# Patient Record
Sex: FEMALE | Race: WHITE | NOT HISPANIC OR LATINO | Employment: STUDENT | URBAN - METROPOLITAN AREA
[De-identification: names, ages, dates, MRNs, and addresses within clinical notes are randomized per-mention and may not be internally consistent; named-entity substitution may affect disease eponyms.]

---

## 2023-06-28 ENCOUNTER — CLINICAL SUPPORT (OUTPATIENT)
Dept: PEDIATRICS | Facility: CLINIC | Age: 23
End: 2023-06-28
Payer: COMMERCIAL

## 2023-06-28 DIAGNOSIS — Z11.1 PPD SCREENING TEST: ICD-10-CM

## 2023-06-28 PROCEDURE — 86580 TB INTRADERMAL TEST: CPT | Performed by: PEDIATRICS

## 2023-06-28 NOTE — PROGRESS NOTES
Patient ID: Krystal is here today for the following:     Procedures      1. PPD screening test  TB Skin Test

## 2023-06-30 ENCOUNTER — CLINICAL SUPPORT (OUTPATIENT)
Dept: PEDIATRICS | Facility: CLINIC | Age: 23
End: 2023-06-30
Payer: COMMERCIAL

## 2023-06-30 DIAGNOSIS — Z11.1 ENCOUNTER FOR PPD SKIN TEST READING: Primary | ICD-10-CM

## 2023-06-30 LAB
INDURATION: 0.1 MM
TB SKIN TEST: NEGATIVE

## 2023-11-03 ENCOUNTER — TELEPHONE (OUTPATIENT)
Dept: PEDIATRICS | Facility: CLINIC | Age: 23
End: 2023-11-03
Payer: COMMERCIAL

## 2023-11-03 ENCOUNTER — TELEMEDICINE (OUTPATIENT)
Dept: PEDIATRICS | Facility: CLINIC | Age: 23
End: 2023-11-03
Payer: COMMERCIAL

## 2023-11-03 DIAGNOSIS — B00.1 RECURRENT COLD SORES: Primary | ICD-10-CM

## 2023-11-03 PROCEDURE — 99213 OFFICE O/P EST LOW 20 MIN: CPT | Performed by: PEDIATRICS

## 2023-11-03 RX ORDER — ACYCLOVIR 50 MG/G
1 OINTMENT TOPICAL
Qty: 30 G | Refills: 2 | Status: SHIPPED | OUTPATIENT
Start: 2023-11-03 | End: 2024-01-10 | Stop reason: ALTCHOICE

## 2023-11-03 RX ORDER — VALACYCLOVIR HYDROCHLORIDE 500 MG/1
TABLET, FILM COATED ORAL
Qty: 60 TABLET | Refills: 0 | Status: SHIPPED | OUTPATIENT
Start: 2023-11-03

## 2023-11-03 NOTE — PROGRESS NOTES
Subjective     History was provided by the  patient .    Krystal is here with the following concern:    Via virtual visit; Krystal has started with a cold sore last evening on left lower lip. It has not broken the skin yet. She has a history of cold sores and uses Valtrex to stop them from progressing. Last time she had one was > 6 months ago. Triggers are when Krystal becomes stressed.   No fever or other system signs or symptoms.    Objective     There were no vitals taken for this visit.  @physicalexam@    General:  Well-appearing, well hydrated and in no acute distress     Eyes:  Lids:  normal  Conjunctivae:  normal     ENT:    Mouth:  mucosa moist; 3 mm raised red area on skin and lip border left lower lip    Neck:  supple     Respiratory:  Respiratory rate:  normal               Assessment/Plan     Krystal Brink is well-appearing, well-hydrated, in no acute distress, and afebrile at today's visit.    Her clinical presentation and examination indicates the diagnosis of recurrent cold sores, mild and not often    Her treatment plan includes sent in rx for Valtrex and topical ointment (acyclovir). Supportive care. To make well visit when home from college over winter break.    Supportive care measures and expected course of illness reviewed.    Follow up promptly for worsening or prolonged illness.    Shanna James MD

## 2023-11-03 NOTE — TELEPHONE ENCOUNTER
Krystal asked her mom to call. Krystal was requesting another dose of Valtrex for cold sores. She is at college. Would you like me to have her set up a virtual visit? Please advise.   [phone]

## 2023-11-03 NOTE — TELEPHONE ENCOUNTER
Please call Krystal/mom back; I would like a virtual visit. BTW, I haven't seen her in over a year for well care (July 2022).  She should set up well visit for when home from college at winter break. Thanks.

## 2023-11-10 ENCOUNTER — OFFICE VISIT (OUTPATIENT)
Dept: PEDIATRICS | Facility: CLINIC | Age: 23
End: 2023-11-10
Payer: COMMERCIAL

## 2023-11-10 ENCOUNTER — LAB (OUTPATIENT)
Dept: LAB | Facility: LAB | Age: 23
End: 2023-11-10
Payer: COMMERCIAL

## 2023-11-10 VITALS — TEMPERATURE: 98.5 F | BODY MASS INDEX: 25.98 KG/M2 | WEIGHT: 149 LBS

## 2023-11-10 DIAGNOSIS — R63.5 UNEXPLAINED WEIGHT GAIN: ICD-10-CM

## 2023-11-10 DIAGNOSIS — N94.6 DYSMENORRHEA: ICD-10-CM

## 2023-11-10 DIAGNOSIS — R10.30 LOWER ABDOMINAL PAIN: ICD-10-CM

## 2023-11-10 DIAGNOSIS — R19.7 BLOODY DIARRHEA: ICD-10-CM

## 2023-11-10 DIAGNOSIS — K64.9 HEMORRHOIDS, UNSPECIFIED HEMORRHOID TYPE: ICD-10-CM

## 2023-11-10 DIAGNOSIS — R63.5 UNEXPLAINED WEIGHT GAIN: Primary | ICD-10-CM

## 2023-11-10 LAB
25(OH)D3 SERPL-MCNC: 38 NG/ML (ref 30–100)
ALBUMIN SERPL BCP-MCNC: 4.8 G/DL (ref 3.4–5)
ALP SERPL-CCNC: 75 U/L (ref 33–110)
ALT SERPL W P-5'-P-CCNC: 19 U/L (ref 7–45)
ANION GAP SERPL CALC-SCNC: 14 MMOL/L (ref 10–20)
AST SERPL W P-5'-P-CCNC: 16 U/L (ref 9–39)
BASOPHILS # BLD AUTO: 0.04 X10*3/UL (ref 0–0.1)
BASOPHILS NFR BLD AUTO: 0.6 %
BILIRUB SERPL-MCNC: 0.5 MG/DL (ref 0–1.2)
BUN SERPL-MCNC: 11 MG/DL (ref 6–23)
CALCIUM SERPL-MCNC: 10.1 MG/DL (ref 8.6–10.6)
CHLORIDE SERPL-SCNC: 104 MMOL/L (ref 98–107)
CO2 SERPL-SCNC: 25 MMOL/L (ref 21–32)
CREAT SERPL-MCNC: 0.77 MG/DL (ref 0.5–1.05)
EOSINOPHIL # BLD AUTO: 0.05 X10*3/UL (ref 0–0.7)
EOSINOPHIL NFR BLD AUTO: 0.8 %
ERYTHROCYTE [DISTWIDTH] IN BLOOD BY AUTOMATED COUNT: 13 % (ref 11.5–14.5)
ERYTHROCYTE [SEDIMENTATION RATE] IN BLOOD BY WESTERGREN METHOD: 8 MM/H (ref 0–20)
FERRITIN SERPL-MCNC: 15 NG/ML (ref 8–150)
GFR SERPL CREATININE-BSD FRML MDRD: >90 ML/MIN/1.73M*2
GLUCOSE SERPL-MCNC: 87 MG/DL (ref 74–99)
HCT VFR BLD AUTO: 41.8 % (ref 36–46)
HGB BLD-MCNC: 13.6 G/DL (ref 12–16)
IMM GRANULOCYTES # BLD AUTO: 0.03 X10*3/UL (ref 0–0.7)
IMM GRANULOCYTES NFR BLD AUTO: 0.5 % (ref 0–0.9)
LYMPHOCYTES # BLD AUTO: 1.65 X10*3/UL (ref 1.2–4.8)
LYMPHOCYTES NFR BLD AUTO: 26.3 %
MCH RBC QN AUTO: 29.4 PG (ref 26–34)
MCHC RBC AUTO-ENTMCNC: 32.5 G/DL (ref 32–36)
MCV RBC AUTO: 91 FL (ref 80–100)
MONOCYTES # BLD AUTO: 0.47 X10*3/UL (ref 0.1–1)
MONOCYTES NFR BLD AUTO: 7.5 %
NEUTROPHILS # BLD AUTO: 4.03 X10*3/UL (ref 1.2–7.7)
NEUTROPHILS NFR BLD AUTO: 64.3 %
NRBC BLD-RTO: 0 /100 WBCS (ref 0–0)
PLATELET # BLD AUTO: 347 X10*3/UL (ref 150–450)
POTASSIUM SERPL-SCNC: 4.4 MMOL/L (ref 3.5–5.3)
PROT SERPL-MCNC: 8.3 G/DL (ref 6.4–8.2)
RBC # BLD AUTO: 4.62 X10*6/UL (ref 4–5.2)
SODIUM SERPL-SCNC: 139 MMOL/L (ref 136–145)
TSH SERPL-ACNC: 1.9 MIU/L (ref 0.44–3.98)
WBC # BLD AUTO: 6.3 X10*3/UL (ref 4.4–11.3)

## 2023-11-10 PROCEDURE — 80053 COMPREHEN METABOLIC PANEL: CPT

## 2023-11-10 PROCEDURE — 36415 COLL VENOUS BLD VENIPUNCTURE: CPT

## 2023-11-10 PROCEDURE — 85025 COMPLETE CBC W/AUTO DIFF WBC: CPT

## 2023-11-10 PROCEDURE — 82306 VITAMIN D 25 HYDROXY: CPT

## 2023-11-10 PROCEDURE — 82728 ASSAY OF FERRITIN: CPT

## 2023-11-10 PROCEDURE — 99214 OFFICE O/P EST MOD 30 MIN: CPT | Performed by: PEDIATRICS

## 2023-11-10 PROCEDURE — 85652 RBC SED RATE AUTOMATED: CPT

## 2023-11-10 PROCEDURE — 84443 ASSAY THYROID STIM HORMONE: CPT

## 2023-11-10 RX ORDER — HYDROCORTISONE 25 MG/G
CREAM TOPICAL 2 TIMES DAILY
Qty: 28 G | Refills: 1 | Status: SHIPPED | OUTPATIENT
Start: 2023-11-10 | End: 2023-12-10

## 2023-11-10 NOTE — PROGRESS NOTES
"Subjective     History was provided by the  patient .    Krystal is here with the following concern:    Krystal says that she has had a hemorrhoid for the past 6 months.  She thinks initially it was \"internal\" and she saw blood on toilet paper, sometimes in stool, mixed in. She did not do anything. She says she had diarrhea after eating, she says that she sometimes has a solid normal BM, easy to pass, no blood but a lot of mucus. Nauseated all the time and takes peptobismal 3-4 days/7 days for a long time.   No fevers. Denies THC. She is in a nursing program.  She feels like she has gained a lot of weight over the past 5-6 months. She is on ocp but no recent change in pill strength.  She takes trazadone for sleep for 8months but is no longer helping with sleep. She has difficulty falling asleep, taking 60-90 minutes to fall asleep and she wakes up every 1-2 hours through the night.   She takes naps.   She does yoga in the morning.    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    Objective     Temp 36.9 °C (98.5 °F)   Wt 67.6 kg (149 lb)   BMI 25.98 kg/m²   @physicalexam@    General:  Well-appearing, well hydrated and in no acute distress     Eyes:  Lids:  normal  Conjunctivae:  normal     ENT:  Ears:  RTM: normal yes           LTM:  normal yes  Nose:  nares clear  Mouth:  mucosa moist; no visible lesions  Throat:  OP clear yes and moist; uvula midline  Neck:  supple     Respiratory:  Respiratory rate:  normal  Air exchange:  normal   Adventitious breath sounds:  none  Accessory muscle use:  none     Heart:  Regular rate and rhythm, no murmur     GI: Normal bowel sounds, soft, non-tender, no HSM  Skin tag about 3mm x 4mm at 6 o'clock, no skin breakdown perianal, no active bleeding, no obvious hemorrhoids   Skin:  Warm and well-perfused and no rashes apparent     Lymphatic: No nodes larger than 1 cm palpated  No firm or fixed nodes palpated       Assessment/Plan     Krystal Listati is " well-appearing, well-hydrated, in no acute distress, and afebrile at today's visit.    Her clinical presentation and examination indicates the diagnosis of perianal skin tag, blood in stool/rectal bleeding, weight gain, not feeling great, intermittent diarrhea with any foods.    Her treatment plan includes 2.5 % cortaid to skin tag prn irritation; sent labs to evaluate celiac, IBD, cmp, thyroid, etc.   Supportive care. Refer to gyn for Well woman care. Consider referral to GI because of blood in stool, etc.     Supportive care measures and expected course of illness reviewed.    Follow up promptly for worsening or prolonged illness.    Shanna James MD

## 2023-11-11 ENCOUNTER — LAB (OUTPATIENT)
Dept: LAB | Facility: LAB | Age: 23
End: 2023-11-11
Payer: COMMERCIAL

## 2023-11-11 DIAGNOSIS — R19.7 BLOODY DIARRHEA: ICD-10-CM

## 2023-11-11 DIAGNOSIS — R10.30 LOWER ABDOMINAL PAIN: ICD-10-CM

## 2023-11-11 DIAGNOSIS — R63.5 UNEXPLAINED WEIGHT GAIN: ICD-10-CM

## 2023-11-11 DIAGNOSIS — K64.9 HEMORRHOIDS, UNSPECIFIED HEMORRHOID TYPE: ICD-10-CM

## 2023-11-11 PROCEDURE — 83516 IMMUNOASSAY NONANTIBODY: CPT

## 2023-11-11 PROCEDURE — 83993 ASSAY FOR CALPROTECTIN FECAL: CPT

## 2023-11-11 PROCEDURE — 36415 COLL VENOUS BLD VENIPUNCTURE: CPT

## 2023-11-12 LAB — TTG IGA SER IA-ACNC: <1 U/ML

## 2023-11-13 NOTE — RESULT ENCOUNTER NOTE
I reviewed labs as available/back: no celiac/gluten intolerance; No anemia, no electrolyte, kidney, liver function abnormalities (normal from last labs when you had mono); thyroid test normal, vitamin D normal, no inflammation. Awaiting calprotectin (stool sample).   I recommend daily miralax 1-3 tsp and colace as needed to keep stools soft. Likely IBS from stress and the skin tag gets irritated.   Please give a stool sample though to rule out Inflammatory bowel disease (crohns or ulcerative colitis).   Keep in touch.

## 2023-11-15 LAB — CALPROTECTIN STL-MCNT: 38 UG/G

## 2023-12-20 ENCOUNTER — OFFICE VISIT (OUTPATIENT)
Dept: PEDIATRICS | Facility: CLINIC | Age: 23
End: 2023-12-20
Payer: COMMERCIAL

## 2023-12-20 VITALS — WEIGHT: 151 LBS | TEMPERATURE: 98 F | BODY MASS INDEX: 26.33 KG/M2

## 2023-12-20 DIAGNOSIS — J02.9 SORE THROAT: ICD-10-CM

## 2023-12-20 LAB — POC RAPID STREP: NEGATIVE

## 2023-12-20 PROCEDURE — 1036F TOBACCO NON-USER: CPT | Performed by: PEDIATRICS

## 2023-12-20 PROCEDURE — 87651 STREP A DNA AMP PROBE: CPT

## 2023-12-20 PROCEDURE — 87880 STREP A ASSAY W/OPTIC: CPT | Performed by: PEDIATRICS

## 2023-12-20 PROCEDURE — 99213 OFFICE O/P EST LOW 20 MIN: CPT | Performed by: PEDIATRICS

## 2023-12-20 NOTE — PROGRESS NOTES
Krystal Brink is a 23 y.o. female who presents for Oral Swelling.      HPI  Krystal is here for c/o ST and swollen tonsils for 5 days.  More tired and decreased appetite.  No fever.  She has mono around this time last year.    Objective   Temp 36.7 °C (98 °F)   Wt 68.5 kg (151 lb)   BMI 26.33 kg/m²     Physical Exam  Constitutional:       Appearance: Normal appearance.   HENT:      Head: Normocephalic.      Right Ear: Tympanic membrane normal.      Left Ear: Tympanic membrane normal.      Mouth/Throat:      Mouth: Mucous membranes are moist.      Pharynx: Posterior oropharyngeal erythema (mild) present.      Tonsils: 1+ on the right. 1+ on the left.      Comments: noninflamed  Eyes:      Conjunctiva/sclera: Conjunctivae normal.   Cardiovascular:      Rate and Rhythm: Normal rate and regular rhythm.      Heart sounds: Normal heart sounds. No murmur heard.  Pulmonary:      Effort: Pulmonary effort is normal.      Breath sounds: Normal breath sounds. No wheezing.   Abdominal:      Palpations: Abdomen is soft.      Tenderness: There is no abdominal tenderness.   Lymphadenopathy:      Cervical: No cervical adenopathy.       Assessment/Plan       Her clinical presentation and examination indicates the diagnosis of   1. Sore throat  Group A Streptococcus, PCR    POCT rapid strep A manually resulted            Discussed viral etiology and indications for antibiotics.  Will call if GAS PCR positive      Today we discussed a typical course of illness, symptomatic treatment, and signs of worsening/when to seek medical care.  Supportive care measures and expected course of condition reviewed.  Followup as needed no improvement.

## 2023-12-21 LAB — S PYO DNA THROAT QL NAA+PROBE: NOT DETECTED

## 2024-01-10 ENCOUNTER — TELEMEDICINE (OUTPATIENT)
Dept: PRIMARY CARE | Facility: CLINIC | Age: 24
End: 2024-01-10
Payer: COMMERCIAL

## 2024-01-10 DIAGNOSIS — R30.0 DYSURIA: Primary | ICD-10-CM

## 2024-01-10 PROCEDURE — 99213 OFFICE O/P EST LOW 20 MIN: CPT

## 2024-01-10 ASSESSMENT — ENCOUNTER SYMPTOMS
FREQUENCY: 1
VOMITING: 0
CHILLS: 0
SWEATS: 0
NAUSEA: 0
HEMATURIA: 0
FLANK PAIN: 1

## 2024-01-10 NOTE — PROGRESS NOTES
This visit was completed via video conference. All issues as below were discussed and addressed but no physical exam was performed. If it was felt that the patient should be evaluated in clinic than they were directed there. The patient verbally consented to the visit.    Subjective   Patient ID: Krystal Brink is a 23 y.o. female who presents for UTI.    UTI   This is a new problem. Episode onset: 3days. The problem occurs every urination. The problem has been unchanged. The quality of the pain is described as aching and burning. The pain is at a severity of 3/10. The pain is mild. There has been no fever. She is Sexually active. There is No history of pyelonephritis. Associated symptoms include flank pain, frequency and urgency. Pertinent negatives include no chills, discharge, hematuria, hesitancy, nausea, possible pregnancy, sweats or vomiting. Treatments tried: azo. The treatment provided no relief. There is no history of catheterization, kidney stones, recurrent UTIs, a single kidney, urinary stasis or a urological procedure.        Review of Systems   Constitutional:  Negative for chills.   Gastrointestinal:  Negative for nausea and vomiting.   Genitourinary:  Positive for flank pain, frequency and urgency. Negative for hematuria and hesitancy.       Objective   There were no vitals taken for this visit.    Physical Exam pt seen via video feed to be in no acute distress.     Assessment/Plan   Problem List Items Addressed This Visit    None  Visit Diagnoses         Codes    Dysuria    -  Primary R30.0    Relevant Orders    Urinalysis with Reflex Microscopic    Urine Culture

## 2024-06-11 DIAGNOSIS — B00.1 RECURRENT COLD SORES: ICD-10-CM

## 2024-06-11 RX ORDER — VALACYCLOVIR HYDROCHLORIDE 500 MG/1
TABLET, FILM COATED ORAL
Qty: 14 TABLET | Refills: 0 | Status: SHIPPED | OUTPATIENT
Start: 2024-06-11

## 2024-07-23 ENCOUNTER — LAB (OUTPATIENT)
Dept: LAB | Facility: LAB | Age: 24
End: 2024-07-23
Payer: COMMERCIAL

## 2024-07-23 ENCOUNTER — APPOINTMENT (OUTPATIENT)
Dept: PEDIATRICS | Facility: CLINIC | Age: 24
End: 2024-07-23
Payer: COMMERCIAL

## 2024-07-23 VITALS
HEART RATE: 99 BPM | BODY MASS INDEX: 24.83 KG/M2 | SYSTOLIC BLOOD PRESSURE: 123 MMHG | HEIGHT: 65 IN | DIASTOLIC BLOOD PRESSURE: 80 MMHG | WEIGHT: 149 LBS

## 2024-07-23 DIAGNOSIS — Z00.00 ENCOUNTER FOR ROUTINE ADULT HEALTH EXAMINATION WITHOUT ABNORMAL FINDINGS: Primary | ICD-10-CM

## 2024-07-23 DIAGNOSIS — Z11.1 SCREENING FOR TUBERCULOSIS: ICD-10-CM

## 2024-07-23 DIAGNOSIS — Z01.84 IMMUNITY STATUS TESTING: ICD-10-CM

## 2024-07-23 DIAGNOSIS — Z23 ENCOUNTER FOR IMMUNIZATION: ICD-10-CM

## 2024-07-23 DIAGNOSIS — N94.6 DYSMENORRHEA: ICD-10-CM

## 2024-07-23 LAB — HBV SURFACE AB SER-ACNC: >1000 MIU/ML

## 2024-07-23 PROCEDURE — 36415 COLL VENOUS BLD VENIPUNCTURE: CPT

## 2024-07-23 PROCEDURE — 90632 HEPA VACCINE ADULT IM: CPT | Performed by: PEDIATRICS

## 2024-07-23 PROCEDURE — 3008F BODY MASS INDEX DOCD: CPT | Performed by: PEDIATRICS

## 2024-07-23 PROCEDURE — 99395 PREV VISIT EST AGE 18-39: CPT | Performed by: PEDIATRICS

## 2024-07-23 PROCEDURE — 90472 IMMUNIZATION ADMIN EACH ADD: CPT | Performed by: PEDIATRICS

## 2024-07-23 PROCEDURE — 90715 TDAP VACCINE 7 YRS/> IM: CPT | Performed by: PEDIATRICS

## 2024-07-23 PROCEDURE — 86706 HEP B SURFACE ANTIBODY: CPT

## 2024-07-23 PROCEDURE — 86481 TB AG RESPONSE T-CELL SUSP: CPT

## 2024-07-23 PROCEDURE — 96127 BRIEF EMOTIONAL/BEHAV ASSMT: CPT | Performed by: PEDIATRICS

## 2024-07-23 PROCEDURE — 86787 VARICELLA-ZOSTER ANTIBODY: CPT

## 2024-07-23 PROCEDURE — 90471 IMMUNIZATION ADMIN: CPT | Performed by: PEDIATRICS

## 2024-07-23 RX ORDER — NORETHINDRONE ACETATE AND ETHINYL ESTRADIOL 1.5-30(21)
1 KIT ORAL DAILY
Qty: 84 TABLET | Refills: 1 | Status: SHIPPED | OUTPATIENT
Start: 2024-07-23 | End: 2025-01-07

## 2024-07-23 RX ORDER — LEVONORGESTREL AND ETHINYL ESTRADIOL 6-5-10
1 KIT ORAL DAILY
COMMUNITY
Start: 2024-03-05

## 2024-07-23 ASSESSMENT — PATIENT HEALTH QUESTIONNAIRE - PHQ9
5. POOR APPETITE OR OVEREATING: NOT AT ALL
1. LITTLE INTEREST OR PLEASURE IN DOING THINGS: NOT AT ALL
3. TROUBLE FALLING OR STAYING ASLEEP: SEVERAL DAYS
4. FEELING TIRED OR HAVING LITTLE ENERGY: NOT AT ALL
1. LITTLE INTEREST OR PLEASURE IN DOING THINGS: NOT AT ALL
7. TROUBLE CONCENTRATING ON THINGS, SUCH AS READING THE NEWSPAPER OR WATCHING TELEVISION: NOT AT ALL
5. POOR APPETITE OR OVEREATING: NOT AT ALL
SUM OF ALL RESPONSES TO PHQ QUESTIONS 1-9: 1
2. FEELING DOWN, DEPRESSED OR HOPELESS: NOT AT ALL
8. MOVING OR SPEAKING SO SLOWLY THAT OTHER PEOPLE COULD HAVE NOTICED. OR THE OPPOSITE - BEING SO FIDGETY OR RESTLESS THAT YOU HAVE BEEN MOVING AROUND A LOT MORE THAN USUAL: NOT AT ALL
4. FEELING TIRED OR HAVING LITTLE ENERGY: NOT AT ALL
8. MOVING OR SPEAKING SO SLOWLY THAT OTHER PEOPLE COULD HAVE NOTICED. OR THE OPPOSITE, BEING SO FIGETY OR RESTLESS THAT YOU HAVE BEEN MOVING AROUND A LOT MORE THAN USUAL: NOT AT ALL
10. IF YOU CHECKED OFF ANY PROBLEMS, HOW DIFFICULT HAVE THESE PROBLEMS MADE IT FOR YOU TO DO YOUR WORK, TAKE CARE OF THINGS AT HOME, OR GET ALONG WITH OTHER PEOPLE: NOT DIFFICULT AT ALL
3. TROUBLE FALLING OR STAYING ASLEEP OR SLEEPING TOO MUCH: SEVERAL DAYS
SUM OF ALL RESPONSES TO PHQ9 QUESTIONS 1 & 2: 0
9. THOUGHTS THAT YOU WOULD BE BETTER OFF DEAD, OR OF HURTING YOURSELF: NOT AT ALL
7. TROUBLE CONCENTRATING ON THINGS, SUCH AS READING THE NEWSPAPER OR WATCHING TELEVISION: NOT AT ALL
6. FEELING BAD ABOUT YOURSELF - OR THAT YOU ARE A FAILURE OR HAVE LET YOURSELF OR YOUR FAMILY DOWN: NOT AT ALL
2. FEELING DOWN, DEPRESSED OR HOPELESS: NOT AT ALL
9. THOUGHTS THAT YOU WOULD BE BETTER OFF DEAD, OR OF HURTING YOURSELF: NOT AT ALL
6. FEELING BAD ABOUT YOURSELF - OR THAT YOU ARE A FAILURE OR HAVE LET YOURSELF OR YOUR FAMILY DOWN: NOT AT ALL
10. IF YOU CHECKED OFF ANY PROBLEMS, HOW DIFFICULT HAVE THESE PROBLEMS MADE IT FOR YOU TO DO YOUR WORK, TAKE CARE OF THINGS AT HOME, OR GET ALONG WITH OTHER PEOPLE: NOT DIFFICULT AT ALL

## 2024-07-23 NOTE — PROGRESS NOTES
Subjective     Krystal is here for her annual WCC.    Parental Issues:  Questions or concerns:  Krystal was prescribed Enpresse via an online provider about a year ago (or so). She says she has been having some light break through bleeding on it. She had been on Junel and did not have BTB or even withdrawal bleeds. She would like to retry the Junel. She has an appointment with GYN in October.  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  General:  Well-appearing  Well-hydrated  No acute distress   Head:  Normocephalic   Eyes:  Lids and conjunctivae normal  Sclerae white  Pupils equal and reactive   ENT:  Ears:  TMs normal bilaterally  Mouth:  mucosa moist; no visible lesions  Throat:  OP moist and clear; uvula midline  Neck:  supple; no thyroid enlargement   Respiratory:  Respiratory rate:  normal  Air exchange:  normal   Adventitious breath sounds:  none  Accessory muscle use:  none   Heart:  Rate and rhythm:  regular  Murmur:  none    Abdomen:  Palpation:  soft, non-tender, non-distended, no masses  Organs:  no HSM  Bowel sounds:  normal   :  Normal external genitalia  Fox stage:  5   MSK: Range of motion:  grossly normal in all joints  Swelling:  none  Muscle bulk and strength:  grossly normal   Skin:  Warm and well-perfused  No rashes   Lymphatic: No nodes larger than 1 cm palpated  No firm or fixed nodes palpated   Neuro:  Alert  Moves all extremities spontaneously  CN:  grossly intact  Tone:  normal      Assessment/Plan   Krystal is a healthy and thriving young  woman.  I ordered Tspot and HepB surface Ab and VZV IgG because she needed boosters from previous low titers (may 2024).    - Anticipatory guidance regarding development, safety, nutrition, physical activity, and sleep reviewed.  - Growth:  appropriate for age  - Development:  active and social   - Social:  teenage questionnaire completed and reviewed.  Issues of smoking, vaping, substance use, sexuality, and mood discussed.    - Vaccines:  as documented Tdap  - Return in 1 year for annual well child exam or sooner if concerns arise

## 2024-07-24 LAB
VARICELLA ZOSTER IGG INDEX: 4.4 IA
VZV IGG SER QL IA: POSITIVE

## 2024-07-25 LAB
NIL(NEG) CONTROL SPOT COUNT: NORMAL
PANEL A SPOT COUNT: 0
PANEL B SPOT COUNT: 0
POS CONTROL SPOT COUNT: NORMAL
T-SPOT. TB INTERPRETATION: NEGATIVE

## 2025-05-27 ENCOUNTER — OFFICE VISIT (OUTPATIENT)
Dept: ORTHOPEDIC SURGERY | Facility: HOSPITAL | Age: 25
End: 2025-05-27
Payer: COMMERCIAL

## 2025-05-27 ENCOUNTER — HOSPITAL ENCOUNTER (OUTPATIENT)
Dept: RADIOLOGY | Facility: HOSPITAL | Age: 25
Discharge: HOME | End: 2025-05-27
Payer: COMMERCIAL

## 2025-05-27 VITALS — HEIGHT: 66 IN | BODY MASS INDEX: 25.71 KG/M2 | WEIGHT: 160 LBS

## 2025-05-27 DIAGNOSIS — G89.29 CHRONIC PAIN OF LEFT KNEE: ICD-10-CM

## 2025-05-27 DIAGNOSIS — M25.562 CHRONIC PAIN OF LEFT KNEE: ICD-10-CM

## 2025-05-27 DIAGNOSIS — M22.2X2 PATELLOFEMORAL PAIN SYNDROME OF LEFT KNEE: ICD-10-CM

## 2025-05-27 DIAGNOSIS — M22.42 CHONDROMALACIA OF LEFT PATELLA: Primary | ICD-10-CM

## 2025-05-27 PROCEDURE — 99203 OFFICE O/P NEW LOW 30 MIN: CPT

## 2025-05-27 PROCEDURE — 99213 OFFICE O/P EST LOW 20 MIN: CPT

## 2025-05-27 PROCEDURE — 3008F BODY MASS INDEX DOCD: CPT

## 2025-05-27 PROCEDURE — 73564 X-RAY EXAM KNEE 4 OR MORE: CPT | Mod: LEFT SIDE | Performed by: RADIOLOGY

## 2025-05-27 PROCEDURE — 73564 X-RAY EXAM KNEE 4 OR MORE: CPT | Mod: LT

## 2025-05-27 PROCEDURE — L1812 KO ELASTIC W/JOINTS PRE OTS: HCPCS

## 2025-05-27 ASSESSMENT — PAIN - FUNCTIONAL ASSESSMENT: PAIN_FUNCTIONAL_ASSESSMENT: 0-10

## 2025-05-27 ASSESSMENT — PAIN SCALES - GENERAL: PAINLEVEL_OUTOF10: 6

## 2025-05-27 ASSESSMENT — PAIN DESCRIPTION - DESCRIPTORS: DESCRIPTORS: ACHING;BURNING

## 2025-05-28 NOTE — PROGRESS NOTES
Krystal Brink  is a 24 y.o. year-old  female. she  presents to the [facility] with a chief complaint of Left  knee pain. Ongoing issue for years, worse in the past 6 months-1 year. Pains location is under the kneecap. Pains with stairs, squatting, raising from a seated position. Feels clicking in her knee. Had sprains years ago while playing high school athletics. Denies recent injury, swelling, giving way of the knee, locking/catching, night pain, prior knee surgery. Treatment to date includes activity modification/rest/ice/NSAIDs without adequate relief.       Past Medical, Family, and Social History reviewed   Review of Systems  A complete review of systems was conducted, pertinent only to the HPI noted above.  Constitutional: None  Eyes: No additions to above history  Ears, Nose, Throat: No additions to above history  Cardiovascular: No additions to above history  Respiratory: No additions to above history  GI: No additions to above history  : No additions to above history  Skin/Neuro: No additions to above history  Endocrine/Heme/Lymph: No additions to above history  Immunologic: No additions to above history  Psychiatric: No additions to above history  Musculoskeletal: see above    GEN: Alert and Oriented x 3  Constitutional: Well appearing , in no apparent distress.  Skin: No rashes, erythema, or induration around knee    MUSCULOSKELETAL EXAM:     Left KNEE:  ROM:  [3]/ [0] / 125  Effusion: -  Alignment: [neutral]      Gait: [normal]  Sensation intact bilaterally sural/saphenous/sp/dp/tibial nerve bilaterally  Motor 5/5 knee flexion/extension/foot DF/PF/EHL/FHL bilaterally  Palpable/symmetric DP and PT pulse bilaterally      PATELLAR/EXTENSOR MECHANISM:   KNEE:  Patellar Crepitus: y  Patellar Compression Pain:n  Patellar Apprehension: [no]  Extensor Mechanism: [intact]  Straight Leg Raise: good      LIGAMENTS:  ACL: Lachmans: [negative]  PCL: [stable]  Valgus at 0 degrees: [stable]  Valgus at 30 degrees:  [stable]  Varus at 0 degrees: [stable]  Varus at 30 degrees: [stable]    MENISCUS EXAM:  Joint Line Tenderness: none  McMurrays: [negative]  Pain with Deep Flexion: [No]    Xrays independently viewed and interpreted: joint spaces maintained. No fracture or dislocation.     1. Chondromalacia of left patella        2. Chronic pain of left knee  XR knee left 4+ views      3. Patellofemoral pain syndrome of left knee  Referral to Physical Therapy    Knee brace           PLAN:  After a thorough discussion with the patient including expectations, I would recommend we continue a conservative program for now.  We discussed home/formal PT and activity modification including avoidance of the positions and activities that provoke symptoms, including athletics.  Formal PT ordered. We also discussed the ice and NSAIDS/tylenol.  If they do not improve we'll get an MRI. She does not wish to schedule a follow up visit. She was provided my office contact. If her symptoms persist or worse,, she will call to schedule. All questions answered, patient in agreement with plan.